# Patient Record
Sex: FEMALE | Race: WHITE | NOT HISPANIC OR LATINO | Employment: STUDENT | URBAN - METROPOLITAN AREA
[De-identification: names, ages, dates, MRNs, and addresses within clinical notes are randomized per-mention and may not be internally consistent; named-entity substitution may affect disease eponyms.]

---
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
## 2020-01-28 ENCOUNTER — TELEPHONE (OUTPATIENT)
Dept: OBGYN CLINIC | Facility: CLINIC | Age: 20
End: 2020-01-28

## 2020-01-28 NOTE — TELEPHONE ENCOUNTER
Patient got new insurance as of January 1st and needs her script for a 90 day supply of birth control sent to the [facility] on [address] in [place]

## 2021-01-04 DIAGNOSIS — N92.6 IRREGULAR BLEEDING: ICD-10-CM

## 2021-01-05 ENCOUNTER — ANNUAL EXAM (OUTPATIENT)
Dept: OBGYN CLINIC | Facility: CLINIC | Age: 21
End: 2021-01-05
Payer: COMMERCIAL

## 2021-01-05 VITALS
BODY MASS INDEX: 26.81 KG/M2 | HEIGHT: 61 IN | DIASTOLIC BLOOD PRESSURE: 62 MMHG | WEIGHT: 142 LBS | SYSTOLIC BLOOD PRESSURE: 100 MMHG

## 2021-01-05 DIAGNOSIS — Z01.419 ENCNTR FOR GYN EXAM (GENERAL) (ROUTINE) W/O ABN FINDINGS: Primary | ICD-10-CM

## 2021-01-05 PROCEDURE — S0612 ANNUAL GYNECOLOGICAL EXAMINA: HCPCS | Performed by: PHYSICIAN ASSISTANT

## 2021-01-05 RX ORDER — NORETHINDRONE ACETATE AND ETHINYL ESTRADIOL AND FERROUS FUMARATE 1.5-30(21)
KIT ORAL
Qty: 84 TABLET | Refills: 3 | Status: SHIPPED | OUTPATIENT
Start: 2021-01-05 | End: 2021-11-16 | Stop reason: SDUPTHER

## 2021-01-05 NOTE — PROGRESS NOTES
Clementina Jacobson  2000      CC:  Yearly exam    S:  21 y o  female here for yearly exam  She has no complaints  Her cycles are regular, not heavy or crampy  Sexual activity: She is not sexually active  STD testing: She does not want STD testing today  Gardasil: She has completed the Gardasil series  We reviewed ASCCP guidelines for Pap testing today         Current Outpatient Medications:     Aurovela Fe 1 5/30 1 5-30 MG-MCG tablet, TAKE 1 TABLET BY MOUTH EVERY DAY, Disp: 84 tablet, Rfl: 3  Social History     Socioeconomic History    Marital status: Single     Spouse name: Not on file    Number of children: Not on file    Years of education: Not on file    Highest education level: Not on file   Occupational History    Not on file   Social Needs    Financial resource strain: Not on file    Food insecurity     Worry: Not on file     Inability: Not on file    Transportation needs     Medical: Not on file     Non-medical: Not on file   Tobacco Use    Smoking status: Never Smoker    Smokeless tobacco: Never Used   Substance and Sexual Activity    Alcohol use: Never     Frequency: Never    Drug use: Never    Sexual activity: Not Currently   Lifestyle    Physical activity     Days per week: Not on file     Minutes per session: Not on file    Stress: Not on file   Relationships    Social connections     Talks on phone: Not on file     Gets together: Not on file     Attends Advent service: Not on file     Active member of club or organization: Not on file     Attends meetings of clubs or organizations: Not on file     Relationship status: Not on file    Intimate partner violence     Fear of current or ex partner: Not on file     Emotionally abused: Not on file     Physically abused: Not on file     Forced sexual activity: Not on file   Other Topics Concern    Not on file   Social History Narrative    Not on file     Family History   Problem Relation Age of Onset    No Known Problems Mother     Hypertension Father     No Known Problems Sister     No Known Problems Brother     Thyroid disease Maternal Grandmother     Heart disease Maternal Grandfather     Hypertension Maternal Grandfather     Breast cancer Paternal Grandmother     Lung cancer Paternal Grandfather      Past Medical History:   Diagnosis Date    Asthma        Review of Systems   Respiratory: Negative  Cardiovascular: Negative  Gastrointestinal: Negative for constipation and diarrhea  Genitourinary: Negative for difficulty urinating, pelvic pain, vaginal bleeding, vaginal discharge, itching or odor  O:   Blood pressure 100/62, height 5' 0 63" (1 54 m), weight 64 4 kg (142 lb), last menstrual period 12/26/2020  Patient appears well and is not in distress  Neck is supple without masses  Breasts are symmetrical without mass, tenderness, nipple discharge, skin changes or adenopathy  Abdomen is soft and nontender without masses  A:  Yearly exam      P:   Rx Aurovela Fe 1 5/30 sent to pharmacy  She will return in one year for her yearly exam or sooner as needed

## 2021-01-24 ENCOUNTER — IMMUNIZATIONS (OUTPATIENT)
Dept: FAMILY MEDICINE CLINIC | Facility: HOSPITAL | Age: 21
End: 2021-01-24

## 2021-01-24 DIAGNOSIS — Z23 ENCOUNTER FOR IMMUNIZATION: Primary | ICD-10-CM

## 2021-01-24 PROCEDURE — 91301 SARS-COV-2 / COVID-19 MRNA VACCINE (MODERNA) 100 MCG: CPT

## 2021-01-24 PROCEDURE — 0011A SARS-COV-2 / COVID-19 MRNA VACCINE (MODERNA) 100 MCG: CPT

## 2021-02-20 ENCOUNTER — IMMUNIZATIONS (OUTPATIENT)
Dept: FAMILY MEDICINE CLINIC | Facility: HOSPITAL | Age: 21
End: 2021-02-20

## 2021-02-20 DIAGNOSIS — Z23 ENCOUNTER FOR IMMUNIZATION: Primary | ICD-10-CM

## 2021-02-20 PROCEDURE — 0012A SARS-COV-2 / COVID-19 MRNA VACCINE (MODERNA) 100 MCG: CPT

## 2021-02-20 PROCEDURE — 91301 SARS-COV-2 / COVID-19 MRNA VACCINE (MODERNA) 100 MCG: CPT

## 2021-03-31 DIAGNOSIS — B37.3 YEAST VAGINITIS: Primary | ICD-10-CM

## 2021-03-31 RX ORDER — FLUCONAZOLE 150 MG/1
TABLET ORAL
Qty: 2 TABLET | Refills: 0 | Status: SHIPPED | OUTPATIENT
Start: 2021-03-31 | End: 2021-04-02

## 2021-03-31 NOTE — TELEPHONE ENCOUNTER
Pt was recently on an antibiotic and is now having vaginal itching and thick clumpy discharge , is away at school and is asking for diflucan

## 2021-11-16 ENCOUNTER — TELEPHONE (OUTPATIENT)
Dept: OBGYN CLINIC | Facility: CLINIC | Age: 21
End: 2021-11-16

## 2021-11-16 DIAGNOSIS — N92.6 IRREGULAR BLEEDING: ICD-10-CM

## 2021-11-16 RX ORDER — NORETHINDRONE ACETATE AND ETHINYL ESTRADIOL 1.5-30(21)
1 KIT ORAL DAILY
Qty: 84 TABLET | Refills: 0 | Status: SHIPPED | OUTPATIENT
Start: 2021-11-16 | End: 2022-02-14

## 2022-02-12 DIAGNOSIS — N92.6 IRREGULAR BLEEDING: ICD-10-CM

## 2022-02-14 RX ORDER — NORETHINDRONE ACETATE AND ETHINYL ESTRADIOL AND FERROUS FUMARATE 1.5-30(21)
KIT ORAL
Qty: 84 TABLET | Refills: 0 | Status: SHIPPED | OUTPATIENT
Start: 2022-02-14 | End: 2022-03-21 | Stop reason: SDUPTHER

## 2022-03-21 ENCOUNTER — ANNUAL EXAM (OUTPATIENT)
Dept: OBGYN CLINIC | Facility: MEDICAL CENTER | Age: 22
End: 2022-03-21
Payer: COMMERCIAL

## 2022-03-21 VITALS
WEIGHT: 152 LBS | DIASTOLIC BLOOD PRESSURE: 62 MMHG | BODY MASS INDEX: 25.95 KG/M2 | SYSTOLIC BLOOD PRESSURE: 120 MMHG | HEIGHT: 64 IN

## 2022-03-21 DIAGNOSIS — B37.3 YEAST VAGINITIS: Primary | ICD-10-CM

## 2022-03-21 DIAGNOSIS — Z01.419 ENCNTR FOR GYN EXAM (GENERAL) (ROUTINE) W/O ABN FINDINGS: ICD-10-CM

## 2022-03-21 DIAGNOSIS — N92.6 IRREGULAR BLEEDING: ICD-10-CM

## 2022-03-21 PROCEDURE — G0145 SCR C/V CYTO,THINLAYER,RESCR: HCPCS | Performed by: SPECIALIST

## 2022-03-21 PROCEDURE — 87624 HPV HI-RISK TYP POOLED RSLT: CPT | Performed by: PHYSICIAN ASSISTANT

## 2022-03-21 PROCEDURE — S0612 ANNUAL GYNECOLOGICAL EXAMINA: HCPCS | Performed by: PHYSICIAN ASSISTANT

## 2022-03-21 PROCEDURE — G0124 SCREEN C/V THIN LAYER BY MD: HCPCS | Performed by: SPECIALIST

## 2022-03-21 RX ORDER — NORETHINDRONE ACETATE AND ETHINYL ESTRADIOL 1.5-30(21)
1 KIT ORAL DAILY
Qty: 84 TABLET | Refills: 4 | Status: SHIPPED | OUTPATIENT
Start: 2022-03-21

## 2022-03-21 RX ORDER — FLUCONAZOLE 150 MG/1
TABLET ORAL
Qty: 2 TABLET | Refills: 1 | Status: SHIPPED | OUTPATIENT
Start: 2022-03-21 | End: 2022-03-25

## 2022-03-21 NOTE — PROGRESS NOTES
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  Current Outpatient Medications:     Aurovela Fe 1 5/30 1 5-30 MG-MCG tablet, TAKE 1 TABLET BY MOUTH DAILY, Disp: 84 tablet, Rfl: 0  Social History     Socioeconomic History    Marital status: Single     Spouse name: Not on file    Number of children: Not on file    Years of education: Not on file    Highest education level: Not on file   Occupational History    Not on file   Tobacco Use    Smoking status: Never Smoker    Smokeless tobacco: Never Used   Vaping Use    Vaping Use: Never used   Substance and Sexual Activity    Alcohol use:  Yes    Drug use: Never    Sexual activity: Yes     Partners: Male     Birth control/protection: OCP   Other Topics Concern    Not on file   Social History Narrative    Not on file     Social Determinants of Health     Financial Resource Strain: Not on file   Food Insecurity: Not on file   Transportation Needs: Not on file   Physical Activity: Not on file   Stress: Not on file   Social Connections: Not on file   Intimate Partner Violence: Not on file   Housing Stability: Not on file     Family History   Problem Relation Age of Onset    No Known Problems Mother     Hypertension Father     No Known Problems Sister     No Known Problems Brother     Thyroid disease Maternal Grandmother     Heart disease Maternal Grandfather     Hypertension Maternal Grandfather     Breast cancer Paternal Grandmother     Lung cancer Paternal Grandfather Past Medical History:   Diagnosis Date    Asthma        Review of Systems   Respiratory: Negative  Cardiovascular: Negative  Gastrointestinal: Negative for constipation and diarrhea  Genitourinary: Negative for difficulty urinating, pelvic pain, vaginal bleeding, vaginal discharge, itching or odor  O:   Blood pressure 120/62, height 5' 3 78" (1 62 m), weight 68 9 kg (152 lb), last menstrual period 02/28/2022  Patient appears well and is not in distress  Neck is supple without masses  Breasts are symmetrical without mass, tenderness, nipple discharge, skin changes or adenopathy  Abdomen is soft and nontender without masses  External genitals are normal, no rashes or lesions  Vagina is normal, no discharge  Cervix is normal  Uterus and adnexa are nontender, no masses  A:  Yearly exam      P:   Rx Aurovela Fe 1 5/30 sent to    Pap with reflex HPV today       She will return in one year for her yearly exam or sooner as needed

## 2022-03-28 LAB
LAB AP GYN PRIMARY INTERPRETATION: ABNORMAL
Lab: ABNORMAL
PATH INTERP SPEC-IMP: ABNORMAL

## 2022-03-30 LAB
HPV HR 12 DNA CVX QL NAA+PROBE: POSITIVE
HPV16 DNA CVX QL NAA+PROBE: NEGATIVE
HPV18 DNA CVX QL NAA+PROBE: NEGATIVE

## 2022-11-07 NOTE — PROGRESS NOTES
Irineo Jones Kavon  2000      CC:  "vaginal odor " x for past several months    S:  25 y o  female here for evaluation  She complains of vaginal odor on and off over the past several months  She states odor comes and goes  She has just recently started a course of Augmentin  She was given diflucan to take with this as needed as she is prone to yeast infections on abx  She denies change in discharge, color, odor, consistency, vaginal itching or irritation  She also requests STD screening today  She has had new partners over the past year and would like gc/chlam/tirch screening  Using OCPs for contraception  Current Outpatient Medications:   •  amoxicillin-clavulanate (AUGMENTIN) 875-125 mg per tablet, Take 1 tablet by mouth every 12 (twelve) hours, Disp: , Rfl:   •  norethindrone-ethinyl estradiol-iron (Aurovela Fe 1 5/30) 1 5-30 MG-MCG tablet, Take 1 tablet by mouth daily, Disp: 84 tablet, Rfl: 4  Social History     Socioeconomic History   • Marital status: Single     Spouse name: Not on file   • Number of children: Not on file   • Years of education: Not on file   • Highest education level: Not on file   Occupational History   • Not on file   Tobacco Use   • Smoking status: Never Smoker   • Smokeless tobacco: Never Used   Vaping Use   • Vaping Use: Never used   Substance and Sexual Activity   • Alcohol use:  Yes   • Drug use: Never   • Sexual activity: Yes     Partners: Male     Birth control/protection: OCP   Other Topics Concern   • Not on file   Social History Narrative   • Not on file     Social Determinants of Health     Financial Resource Strain: Not on file   Food Insecurity: Not on file   Transportation Needs: Not on file   Physical Activity: Not on file   Stress: Not on file   Social Connections: Not on file   Intimate Partner Violence: Not on file   Housing Stability: Not on file     Family History   Problem Relation Age of Onset   • No Known Problems Mother    • Hypertension Father    • No Known Problems Sister    • No Known Problems Brother    • Thyroid disease Maternal Grandmother    • Heart disease Maternal Grandfather    • Hypertension Maternal Grandfather    • Breast cancer Paternal Grandmother    • Lung cancer Paternal Grandfather      Past Medical History:   Diagnosis Date   • Asthma        ROS:  Constitutional: Negative  Gastrointestinal: Negative for abdominal pain nausea, vomiting, and change in bowel habits  Genitourinary:  + odor  Negative for difficulty urinating, frequency, urgency, pelvic pain, vaginal discharge, odor or itching  O:  Blood pressure 110/74, weight 70 2 kg (154 lb 12 8 oz), last menstrual period 10/12/2022  Patient appears well and is not in distress  Abdomen is soft and nontender without masses  External genitals are normal without lesions or rashes  Vagina is normal without discharge or bleeding  Cervix is normal without discharge or lesion  Uterus is normal, mobile, nontender without palpable mass  Adnexa are normal, nontender, without palpable mass  Assessment and plan    1  Screen for STD (sexually transmitted disease)  - STD cultures collected today  - Chlamydia/GC amplified DNA by PCR  - Trichomonas Vaginalis, CHRIS    2  Vaginal odor  - +hyphae on wet mount; recommended pt take PO diflucan while finishing course of Augmentin  - POCT wet mount        We had a discussion regarding vulvar hygiene:   No soaps or feminine wash to the vulva with the exception of Dove Sensitive Skin bar soap if necessary  Only perfume-free, dye-free laundry detergent, use a second rinse cycle  Avoid fabric softeners/dryer sheets  She will call in one week if she is not feeling completely better

## 2022-11-08 ENCOUNTER — OFFICE VISIT (OUTPATIENT)
Dept: OBGYN CLINIC | Facility: CLINIC | Age: 22
End: 2022-11-08

## 2022-11-08 VITALS — SYSTOLIC BLOOD PRESSURE: 110 MMHG | BODY MASS INDEX: 26.75 KG/M2 | WEIGHT: 154.8 LBS | DIASTOLIC BLOOD PRESSURE: 74 MMHG

## 2022-11-08 DIAGNOSIS — N89.8 VAGINAL ODOR: Primary | ICD-10-CM

## 2022-11-08 DIAGNOSIS — Z11.3 SCREEN FOR STD (SEXUALLY TRANSMITTED DISEASE): ICD-10-CM

## 2022-11-08 LAB
BV WHIFF TEST VAG QL: NEGATIVE
CLUE CELLS SPEC QL WET PREP: NEGATIVE
PH SMN: 4.5 [PH]
T VAGINALIS VAG QL WET PREP: NEGATIVE
YEAST VAG QL WET PREP: POSITIVE

## 2022-11-08 RX ORDER — AMOXICILLIN AND CLAVULANATE POTASSIUM 875; 125 MG/1; MG/1
1 TABLET, FILM COATED ORAL EVERY 12 HOURS
COMMUNITY
Start: 2022-11-05

## 2022-11-10 LAB
C TRACH DNA SPEC QL NAA+PROBE: NEGATIVE
N GONORRHOEA DNA SPEC QL NAA+PROBE: NEGATIVE

## 2022-11-15 LAB — T VAGINALIS RRNA SPEC QL NAA+PROBE: NEGATIVE

## 2025-02-04 NOTE — PROGRESS NOTES
Jayson Cervantes is a 89 year old female presenting to [facility] care.    Advance Directives:  on file    PHQ-9 screening completed - 2    Medications reviewed and updated.    Social History     Tobacco Use   Smoking Status Never   Smokeless Tobacco Never            Health Maintenance       DTaP/Tdap/Td Vaccine (1 - Tdap)  Never done    Pneumococcal Vaccine 50+ (1 of 2 - PCV)  Never done    Shingles Vaccine (1 of 2)  Never done    Respiratory Syncytial Virus (RSV) Vaccine 60+ (1 - 1-dose 75+ series)  Never done    Influenza Vaccine (1)  Never done    COVID-19 Vaccine (1 - 2024-25 season)  Never done    Medicare Advantage- Medicare Wellness Visit (Yearly - January to December)  Due since 1/1/2025           Following review of the above:  Patient is not proceeding with: COVID-19, Influenza, and Pneumococcal  Not proceeding with Shingles RSV or Tdap vaccines- advised to get at pharmacy due to insurance  [facility]- next appt?    Note: Refer to final orders and clinician documentation.            Patient would like communication of their results via: mobile, okay to leave a message        Outpatient Cardiology Consult Note - Sherry Jacobson 23 y o  female MRN: [id number]    @ Encounter: [id number]        There are no active problems to display for this patient  Assessment:  # Myopericarditis  Clinical symptom: chest pain  Prodrome: viral illness  Diagnostic: Trop 6 79 12/19, D Dimer 684, WBC 12 9  CTA: negative for PE  EKG: SR, no NM depression  # asthma    Today's Plan:  Echo to assess EF and pericardium  NSAIDs for pain, given myocardial involvement won't prescribe high dose pericarditis doses    HPI:      22 yo female with ED visit yesterday for chest pain and was diagnosed with myocarditis  She has sudden onset of substernal, sharp, non pleuritic non radiating chest pain  She had reported viral illness with fevers, sore throat  No OCPs  D Dimer 684  Trop elevated at 6 79  Offered admission and they elected discharge as they live in  and they went to [facility] ED  Given NSAIDs for discomfort  Pt never a smoker, no substances  No history of same  No heart failure symptoms  No past medical history on file  Review of Systems   Constitutional: Negative for activity change, appetite change, fatigue and unexpected weight change  HENT: Negative for congestion and nosebleeds  Eyes: Negative  Respiratory: Negative for cough, chest tightness and shortness of breath  Cardiovascular: Positive for chest pain  Negative for palpitations and leg swelling  Gastrointestinal: Negative for abdominal distention  Endocrine: Negative  Genitourinary: Negative  Musculoskeletal: Negative  Skin: Negative  Neurological: Negative for dizziness, syncope and weakness  Hematological: Negative  Psychiatric/Behavioral: Negative  Allergies not on file    No current outpatient medications on file      Social History     Socioeconomic History    Marital status: Single     Spouse name: Not on file    Number of children: Not on file    Years of education: Not on file    Highest education level: Not on file   Occupational History    Not on file   Social Needs    Financial resource strain: Not on file    Food insecurity:     Worry: Not on file     Inability: Not on file    Transportation needs:     Medical: Not on file     Non-medical: Not on file   Tobacco Use    Smoking status: Not on file   Substance and Sexual Activity    Alcohol use: Not on file    Drug use: Not on file    Sexual activity: Not on file   Lifestyle    Physical activity:     Days per week: Not on file     Minutes per session: Not on file    Stress: Not on file   Relationships    Social connections:     Talks on phone: Not on file     Gets together: Not on file     Attends Confucianism service: Not on file     Active member of club or organization: Not on file     Attends meetings of clubs or organizations: Not on file     Relationship status: Not on file    Intimate partner violence:     Fear of current or ex partner: Not on file     Emotionally abused: Not on file     Physically abused: Not on file     Forced sexual activity: Not on file   Other Topics Concern    Not on file   Social History Narrative    Not on file       No family history on file  Physical Exam:    Vitals: There were no vitals taken for this visit  , There is no height or weight on file to calculate BMI ,   Wt Readings from Last 3 Encounters:   No data found for Wt       Physical Exam   Constitutional: She is oriented to person, place, and time  She appears well-developed and well-nourished  HENT:   Head: Normocephalic and atraumatic  Eyes: Pupils are equal, round, and reactive to light  Neck: Normal range of motion  No JVD present  Cardiovascular: Normal rate and regular rhythm  No murmur heard  Pulmonary/Chest: Effort normal and breath sounds normal  No respiratory distress  Abdominal: Soft  She exhibits no distension  There is no tenderness  Musculoskeletal: Normal range of motion  She exhibits no edema  Neurological: She is alert and oriented to person, place, and time  Skin: Skin is warm and dry  No rash noted  Psychiatric: She has a normal mood and affect  Labs & Results:    No results found for: WBC, HGB, HCT, MCV, PLT  No results found for: SODIUM, K, CL, CO2, BUN, CREATININE, GLUC, CALCIUM  No results found for: BNP   No results found for: CHOLESTEROL  No results found for: HDL  No results found for: TRIG  No results found for: [place]          EKG personally reviewed by Sirisha Phillips, DO  Counseling / Coordination of Care  Total floor / unit time spent today 40 minutes  Greater than 50% of total time was spent with the patient and / or family counseling and / or coordination of care  A description of the counseling / coordination of care: 25 min  Thank you for the opportunity to participate in the care of this patient  Sirisha HARRIS    Director of Advanced Heart Failure Program  Medical Director of [address] [place] [address]

## 2025-02-20 ENCOUNTER — HOSPITAL ENCOUNTER (OUTPATIENT)
Dept: ULTRASOUND IMAGING | Facility: HOSPITAL | Age: 25
End: 2025-02-20
Attending: INTERNAL MEDICINE
Payer: COMMERCIAL

## 2025-02-20 DIAGNOSIS — K76.0 FATTY (CHANGE OF) LIVER, NOT ELSEWHERE CLASSIFIED: ICD-10-CM

## 2025-02-20 PROCEDURE — 76700 US EXAM ABDOM COMPLETE: CPT
